# Patient Record
Sex: MALE | Race: OTHER
[De-identification: names, ages, dates, MRNs, and addresses within clinical notes are randomized per-mention and may not be internally consistent; named-entity substitution may affect disease eponyms.]

---

## 2023-04-30 ENCOUNTER — HOSPITAL ENCOUNTER (EMERGENCY)
Dept: HOSPITAL 46 - ED | Age: 68
LOS: 1 days | Discharge: HOME | End: 2023-05-01
Payer: COMMERCIAL

## 2023-04-30 VITALS — WEIGHT: 204.37 LBS | BODY MASS INDEX: 32.08 KG/M2 | HEIGHT: 67 IN

## 2023-04-30 DIAGNOSIS — R73.9: ICD-10-CM

## 2023-04-30 DIAGNOSIS — H81.10: Primary | ICD-10-CM

## 2023-04-30 DIAGNOSIS — N18.6: ICD-10-CM

## 2023-04-30 DIAGNOSIS — Z79.899: ICD-10-CM

## 2023-04-30 DIAGNOSIS — I12.0: ICD-10-CM

## 2023-04-30 DIAGNOSIS — E78.5: ICD-10-CM

## 2023-05-01 VITALS — SYSTOLIC BLOOD PRESSURE: 125 MMHG | DIASTOLIC BLOOD PRESSURE: 81 MMHG

## 2023-05-01 NOTE — EKG
[facility]
                                    [address]
                                  [place], [place]  [address]
_________________________________________________________________________________________
                                                                 Signed   
 
 
Normal sinus rhythm
Normal ECG
No previous ECGs available
Confirmed by OLGA BANKS MD ([id number]) on 5/1/2023 3:25:10 PM
 
 
 
 
 
 
 
 
 
 
 
 
 
 
 
 
 
 
 
 
 
 
 
 
 
 
 
 
 
 
 
 
 
 
 
 
 
 
 
 
 
    Electronically Signed By: OLGA BANKS MD  05/01/23 1525
_________________________________________________________________________________________
PATIENT NAME:     AILIN MONTANA                
MEDICAL RECORD #: [id number]                     Electrocardiogram             
          ACCT #: [id number]  
DATE OF BIRTH:   [birth year]                                       
PHYSICIAN:   OLGA BANKS MD                    REPORT #: [id number]
REPORT IS CONFIDENTIAL AND NOT TO BE RELEASED WITHOUT AUTHORIZATION